# Patient Record
(demographics unavailable — no encounter records)

---

## 2024-10-21 NOTE — HISTORY OF PRESENT ILLNESS
[FreeTextEntry1] : 56F present to clinic for bilateral feet evaluation.  Pt is now s/p R foot partial 1st and 2nd ray resection, open (DOS 7/23/24) at  after a [facility] fasc infection. Pt also states that she is s/p L fem-pop bypass on 8/8 w Dr Kebede. Pt reports that she has been cleaning her wound with saline, and applying mupirocin ointment daily to wound. Patient also states that she was discharged with antibiotics which his finished. Patient denies any other pedal complaint.   10/21/24 FBS: 75 unknown A1C

## 2024-10-21 NOTE — ASSESSMENT
[FreeTextEntry1] : 56F w 7/23 s/p R foot partial 1st and 2nd ray resection, open - Patient seen and evaluated w  [id number] - Right foot partial 1st and 2nd ray wound to subQ, granular wound bed with no drainage, no erythema, no edema, periwound hyperkeratosis no SOI, left foot submet 1 wound to subQ, no purulence, and no SOI. plantar tinea pedis Bilaterally  - Rx mupirocin. Patient to apply ointment daily followed by 4x4 gauze and román.  - Rx clotrimazole cream - Recommend vascular referral for follow up on his bypass. - RTC in 2 weeks

## 2024-10-21 NOTE — PHYSICAL EXAM
[2+] : left foot dorsalis pedis 2+ [FreeTextEntry1] : tinea pedis bilateral  Left foot hallux wound to subQ, no purulence, no drainage, no acute signs of infection [FreeTextEntry2] : 7 [FreeTextEntry3] : 3 [FreeTextEntry4] : 0.2 [de-identified] : Wound measurinX3X0.2 CM  [TWNoteComboBox1] : Right [TWNoteComboBox5] : No [TWNoteComboBox6] : Surgical

## 2024-11-20 NOTE — ASSESSMENT
[FreeTextEntry1] : 56F w 7/23 s/p R foot partial 1st and 2nd ray resection, open - Patient seen and evaluated w  [id number] - Right foot partial 1st and 2nd ray wound healed, no erythema, no edema, periwound hyperkeratosis no SOI, left foot submet 1 wound to dermis, no purulence, and no SOI for plantar tinea pedis Bilaterally  - Rx mupirocin. Patient to apply ointment daily to left hallux wound - Rx clotrimazole cream - Pt expressed interest in receiving a toe filler for the amputation on the right foot. Orthotist was present and had a discussion about cost and fitting of toe filler with bilateral custom orthotics. Pt states she will return in 3 months time and be fitted for toe filler and b/l orthotics - Disucssed proper shoe wear to be worn moving forward and continue wearing when orthotics are fitted. - Recommend vascular referral for follow up on his bypass. - RTC in 2 weeks

## 2024-11-20 NOTE — PHYSICAL EXAM
[2+] : left foot dorsalis pedis 2+ [FreeTextEntry1] : tinea pedis bilateral  Left foot hallux wound to dermis with hyperkeratotic skin over, with no acute signs of infection Right foot wound healed. [FreeTextEntry2] : 7 [FreeTextEntry3] : 3 [FreeTextEntry4] : 0.2 [de-identified] : Wound measurinX3X0.2 CM  [TWNoteComboBox1] : Right [TWNoteComboBox5] : No [TWNoteComboBox6] : Surgical

## 2024-11-20 NOTE — HISTORY OF PRESENT ILLNESS
[FreeTextEntry1] : 56F present to clinic for bilateral feet evaluation.  Pt is now s/p R foot partial 1st and 2nd ray resection, open (DOS 7/23/24) at  after a [facility] fasc infection. Pt also states that she is s/p L fem-pop bypass on 8/8 w Dr Kebede. Pt reports that she has been cleaning her wound with saline, and applying mupirocin ointment daily to wound. She states the wound has completely healed. She would like more antifungal cream as she is running low. Pt presents for left big toe callus. Patient denies any other pedal complaint.   10/21/24 FBS: 75 unknown A1C